# Patient Record
Sex: FEMALE | Race: WHITE | NOT HISPANIC OR LATINO | Employment: OTHER | URBAN - METROPOLITAN AREA
[De-identification: names, ages, dates, MRNs, and addresses within clinical notes are randomized per-mention and may not be internally consistent; named-entity substitution may affect disease eponyms.]

---

## 2018-11-26 ENCOUNTER — PATIENT OUTREACH (OUTPATIENT)
Dept: ADMINISTRATIVE | Facility: HOSPITAL | Age: 68
End: 2018-11-26

## 2018-12-03 ENCOUNTER — HOSPITAL ENCOUNTER (OUTPATIENT)
Dept: RADIOLOGY | Facility: HOSPITAL | Age: 68
Discharge: HOME | End: 2018-12-03
Attending: FAMILY MEDICINE
Payer: MEDICARE

## 2018-12-03 ENCOUNTER — OFFICE VISIT (OUTPATIENT)
Dept: FAMILY MEDICINE | Facility: CLINIC | Age: 68
End: 2018-12-03
Payer: MEDICARE

## 2018-12-03 VITALS
HEART RATE: 88 BPM | OXYGEN SATURATION: 98 % | BODY MASS INDEX: 29.81 KG/M2 | WEIGHT: 157.88 LBS | SYSTOLIC BLOOD PRESSURE: 140 MMHG | DIASTOLIC BLOOD PRESSURE: 94 MMHG | TEMPERATURE: 98 F | HEIGHT: 61 IN

## 2018-12-03 DIAGNOSIS — R03.0 ELEVATED BLOOD PRESSURE READING IN OFFICE WITHOUT DIAGNOSIS OF HYPERTENSION: ICD-10-CM

## 2018-12-03 DIAGNOSIS — R10.84 GENERALIZED ABDOMINAL PAIN: Primary | ICD-10-CM

## 2018-12-03 DIAGNOSIS — R10.84 GENERALIZED ABDOMINAL PAIN: ICD-10-CM

## 2018-12-03 DIAGNOSIS — Z11.59 ENCOUNTER FOR HEPATITIS C SCREENING TEST FOR LOW RISK PATIENT: ICD-10-CM

## 2018-12-03 DIAGNOSIS — K29.70 GASTRITIS, PRESENCE OF BLEEDING UNSPECIFIED, UNSPECIFIED CHRONICITY, UNSPECIFIED GASTRITIS TYPE: ICD-10-CM

## 2018-12-03 DIAGNOSIS — Z87.891 EX-SMOKER: ICD-10-CM

## 2018-12-03 DIAGNOSIS — Z82.49 FAMILY HISTORY OF HEART ATTACK: ICD-10-CM

## 2018-12-03 DIAGNOSIS — R11.2 INTRACTABLE VOMITING WITH NAUSEA, UNSPECIFIED VOMITING TYPE: ICD-10-CM

## 2018-12-03 DIAGNOSIS — Z12.11 COLON CANCER SCREENING: ICD-10-CM

## 2018-12-03 DIAGNOSIS — E78.49 OTHER HYPERLIPIDEMIA: ICD-10-CM

## 2018-12-03 PROCEDURE — 3288F FALL RISK ASSESSMENT DOCD: CPT | Mod: CPTII,S$GLB,, | Performed by: FAMILY MEDICINE

## 2018-12-03 PROCEDURE — 74019 RADEX ABDOMEN 2 VIEWS: CPT | Mod: TC,FY,PO

## 2018-12-03 PROCEDURE — 99999 PR PBB SHADOW E&M-EST. PATIENT-LVL III: CPT | Mod: PBBFAC,,, | Performed by: FAMILY MEDICINE

## 2018-12-03 PROCEDURE — 1100F PTFALLS ASSESS-DOCD GE2>/YR: CPT | Mod: CPTII,S$GLB,, | Performed by: FAMILY MEDICINE

## 2018-12-03 PROCEDURE — 74019 RADEX ABDOMEN 2 VIEWS: CPT | Mod: 26,,, | Performed by: RADIOLOGY

## 2018-12-03 PROCEDURE — 99204 OFFICE O/P NEW MOD 45 MIN: CPT | Mod: S$GLB,,, | Performed by: FAMILY MEDICINE

## 2018-12-03 RX ORDER — IBUPROFEN 200 MG
200 TABLET ORAL EVERY 6 HOURS PRN
COMMUNITY
End: 2018-12-11 | Stop reason: SINTOL

## 2018-12-03 RX ORDER — HYDROGEN PEROXIDE 3 %
20 SOLUTION, NON-ORAL MISCELLANEOUS
COMMUNITY

## 2018-12-03 NOTE — PATIENT INSTRUCTIONS
Prevention Guidelines, Women Ages 65 and Older  Screening tests and vaccines are an important part of managing your health. Health counseling is essential, too. Below are guidelines for these, for women ages 65 and older. Talk with your healthcare provider to make sure youre up to date on what you need.  Screening Who needs it How often   Type 2 diabetes or prediabetes All adults beginning at age 45 and adults without symptoms at any age who are overweight or obese and have 1 or more additional risk factors for diabetes At least every 3 years   Alcohol misuse All women in this age group At routine exams   Blood pressure All women in this age group Every 2 years if your blood pressure is less than 120/80 mm Hg; yearly if your systolic blood pressure is 120 to 139 mm Hg, or your diastolic blood pressure reading is 80 to 89 mm Hg   Breast cancer All women in this age group Yearly mammogram and clinical breast exam1   Cervical cancer Only women who had abnormal screening results before age 65 Talk with your healthcare provider   Chlamydia Women at increased risk for infection At routine exams   Colorectal cancer All women in this age group1 Flexible sigmoidoscopy every 5 years, or colonoscopy every 10 years, or double-contrast barium enema every 5 years; yearly fecal occult blood test or fecal immunochemical test; or a stool DNA test as often as your healthcare provider advises; talk with your healthcare provider about which tests are best for you   Depression All women in this age group At routine exams   Gonorrhea Sexually active women at increased risk for infection At routine exams   Hepatitis C Anyone at increased risk; 1 time for those born between 1945 and 1965 At routine exams   High cholesterol or triglycerides All women in this age group who are at risk for coronary artery disease At least every 5 years   HIV Women at increased risk for infection - talk with your healthcare provider At routine exams   Lung  cancer Adults age 55 to 80 who have smoked Yearly screening in smokers with 30 pack-year history of smoking or who quit within 15 years   Obesity All women in this age group At routine exams   Osteoporosis All women in this age group Bone density test at age 65, then follow-up as advised by your healthcare provider   Syphilis Women at increased risk for infection - talk with your healthcare provider At routine exams   Thyroid-Stimulating Hormone (TSH) All women in this age group Every 5 years   Tuberculosis Women at increased risk for infection - talk with your healthcare provider Ask your healthcare provider   Vision All women in this age group Every 1 to 2 years; if you have a chronic health condition, ask your healthcare provider if you need exams more often   Vaccine Who needs it How often   Chickenpox (varicella) All women in this age group who have no record of this infection or vaccine 2 doses; second dose should be given at least 4 weeks after the first dose   Hepatitis A Women at increased risk for infection - talk with your healthcare provider 2 doses given 6 months apart   Hepatitis B Women at increased risk for infection - talk with your healthcare provider 3 doses over 6 months; second dose should be given 1 month after the first dose; the third dose should be given at least 2 months after the second dose and at least 4 months after the first dose   Haemophilus influenza Type B (HIB) Women at increased risk for infection - talk with your healthcare provider 1 to 3 doses   Influenza (flu) All women in this age group Once a year   Pneumococcal conjugate vaccine (PCV13) and pneumococcal polysaccharide vaccine (PPSV23) All women in this age group 1 dose of each vaccine   Tetanus/diphtheria/pertussis (Td/Tdap) booster All women in this age group Td every 10 years, or a one-time dose of Tdap instead of a Td booster after age 18, then Td every 10 years   Zoster All women in this age group 1 dose   Counseling  Who needs it How often   Diet and exercise Women who are overweight or obese When diagnosed, and then at routine exams   Fall prevention (exercise and vitamin D supplements) All women in this age group At routine exams   Sexually transmitted infection prevention Women at increased risk for infection - talk with your healthcare provider At routine exams   Use of daily aspirin Women ages 55 and up in this age group who are at risk for cardiovascular health problems such as stroke When your risk is known   Use of tobacco and the health effects it can cause All women in this age group Every exam   1[employer]  Date Last Reviewed: 8/9/2015  © 2710-3308 [employer]. [address] [place] [address], [place] [address]. All rights reserved. This information is not intended as a substitute for professional medical care. Always follow your healthcare professional's instructions.

## 2018-12-03 NOTE — PROGRESS NOTES
Subjective:       Patient ID: Leanna Monroy is a 68 y.o. female.    Chief Complaint: Establish Care and GI Problem    GI problems: present for more than 6 months.  Gradual onset and worsening in the past month. Describes it as a swelling in the epigastric region.  Better when she gets up in the morning, but as the day goes by, it starts swelling. In the past month, she has been having nausea vomiting. Vomitus-mainly fluid and sometimes food.  Also associated with feeling full too quickly and inability to eat as much.  Advocates GERD for which she takes Nexium.  Denies diarrhea, fever, no weight loss and no hematemesis.  She denies abdominal pain but says she can feel pressure in the epigastric area    She has been on Nexium since  when she was diagnosed with gastritis and that was the last time she had seen a provider.    Blood pressure was found to be elevated and she has no hypertension.    Also with hyperlipidemia per pt.    30 pack smoking year and quit 5 months ago on her own.    Her father  at 53 and the brother  at 42  with MI.    Past Medical History:   Diagnosis Date    History of stomach ulcers      Family History   Problem Relation Age of Onset    Cancer Mother     Dementia Mother     Heart attack Father     Emphysema Father      Social History     Socioeconomic History    Marital status:      Spouse name: Not on file    Number of children: Not on file    Years of education: Not on file    Highest education level: Not on file   Social Needs    Financial resource strain: Not on file    Food insecurity - worry: Not on file    Food insecurity - inability: Not on file    Transportation needs - medical: Not on file    Transportation needs - non-medical: Not on file   Occupational History    Not on file   Tobacco Use    Smoking status: Former Smoker     Last attempt to quit: 2018     Years since quittin.9   Substance and Sexual Activity    Alcohol use: No      "Frequency: Never    Drug use: No    Sexual activity: Yes     Partners: Male   Other Topics Concern    Not on file   Social History Narrative    Not on file     Outpatient Encounter Medications as of 12/3/2018   Medication Sig Dispense Refill    esomeprazole (NEXIUM) 20 MG capsule Take 20 mg by mouth before breakfast.      ibuprofen (ADVIL,MOTRIN) 200 MG tablet Take 200 mg by mouth every 6 (six) hours as needed for Pain.       No facility-administered encounter medications on file as of 12/3/2018.        Review of Systems   Constitutional: Negative for chills and fever.   HENT: Negative for congestion and facial swelling.    Eyes: Negative for discharge and itching.   Respiratory: Negative for cough and wheezing.    Cardiovascular: Negative for chest pain and palpitations.   Gastrointestinal: Negative for abdominal pain, nausea and vomiting.   Endocrine: Negative for cold intolerance and heat intolerance.   Genitourinary: Negative for dysuria and flank pain.   Musculoskeletal: Negative for myalgias and neck stiffness.   Skin: Negative for pallor and wound.   Neurological: Negative for facial asymmetry and weakness.   Psychiatric/Behavioral: Negative for agitation and suicidal ideas.       Objective:      BP (!) 140/94 (BP Location: Right arm, Patient Position: Sitting, BP Method: Medium (Manual))   Pulse 88   Temp 98.2 °F (36.8 °C) (Oral)   Ht 5' 1" (1.549 m)   Wt 71.6 kg (157 lb 13.6 oz)   SpO2 98%   BMI 29.83 kg/m²   Physical Exam   Constitutional: She is oriented to person, place, and time. She appears well-developed. No distress.   HENT:   Head: Normocephalic and atraumatic.   Right Ear: External ear normal.   Left Ear: External ear normal.   Eyes: Conjunctivae and EOM are normal.   Neck: Neck supple. No thyromegaly present.   Cardiovascular: Normal rate and regular rhythm.   Pulmonary/Chest: Effort normal. No respiratory distress.   Abdominal: Soft. Bowel sounds are normal. She exhibits no " distension. There is no hepatosplenomegaly. There is tenderness in the right upper quadrant, right lower quadrant and epigastric area. There is no rebound, no guarding, no CVA tenderness and negative Frank's sign.       Genitourinary:   Genitourinary Comments: deferred   Musculoskeletal: She exhibits no edema or deformity.   Lymphadenopathy:        Head (right side): No submandibular adenopathy present.        Head (left side): No submandibular adenopathy present.     She has no cervical adenopathy.   Neurological: She is alert and oriented to person, place, and time.   Skin: Skin is warm and dry.   Psychiatric: She has a normal mood and affect. Her behavior is normal.   Nursing note and vitals reviewed.      Results for orders placed or performed during the hospital encounter of 01/26/11   CBC auto differential   Result Value Ref Range    WBC 5.70 4.8 - 10.8 K/uL    RBC 4.39 4.00 - 5.40 M/uL    Hemoglobin 13.9 12.0 - 16.0 gm/dl    Hematocrit 39.5 37.0 - 48.5 %    MCV 90.1 82 - 95 fL    MCH 31.8 (H) 27 - 31 pg    MCHC 35.3 32 - 36 %    RDW 14.4 11.5 - 14.5 %    Gran% 64.4 38 - 73 %    Lymph% 29.4 21 - 44 %    Mono% 4.3 0.0 - 7.4 %    Eosinophil% 1.5 0.0 - 4.2 %    Basophil% 0.4 0 - 1.9 %    Gran # (ANC) 3.7 1.8 - 7.7 K/uL    Lymph # 1.7 1 - 4.8 K/uL    Mono # 0.2 0.0 - 0.8 K/uL    Eos # 0.1 0 - 0.45 K/uL    Baso # 0.0 0.0 - 0.2 K/uL    Platelets 240 150 - 350 K/uL    MPV 8.5 (L) 9.2 - 12.9 fL   Hepatic function panel   Result Value Ref Range    Albumin 4.1 3.5 - 5.2 g/dl    Total Bilirubin 0.2 0.1 - 1.0 mg/dl    Bilirubin, Direct 0.0 (L) 0.1 - 0.3 mg/dl    AST 17 10 - 40 U/L    ALT 26 10 - 44 U/L    Alkaline Phosphatase 103 55 - 135 U/L    Total Protein 7.3 6.0 - 8.4 g/dL   Basic metabolic panel   Result Value Ref Range    BUN, Bld 15 6 - 20 mg/dl    Sodium 139 136 - 145 mmol/L    Potassium 4.2 3.5 - 5.1 mmol/L    Chloride 104 95 - 110 mmol/L    CO2 32.5 (H) 23.0 - 29.0 mmol/L    Glucose 107 70 - 110 mg/dl     Creatinine 0.9 0.5 - 1.4 mg/dl    Calcium 9.1 8.7 - 10.5 mg/dl    Anion Gap 8 (L) 10 - 20 mmol/L    eGFR if non African American >60 >60 mL/min    eGFR if African American >60 >60 mL/min     Assessment:       1. Generalized abdominal pain    2. Colon cancer screening    3. Intractable vomiting with nausea, unspecified vomiting type    4. Gastritis, presence of bleeding unspecified, unspecified chronicity, unspecified gastritis type    5. Ex-smoker    6. Elevated blood pressure reading in office without diagnosis of hypertension    7. Family history of heart attack    8. Encounter for hepatitis C screening test for low risk patient    9. Laboratory exam ordered as part of routine general medical examination    10. Other hyperlipidemia        Plan:   Colon cancer screening  -     Fecal Immunochemical Test (iFOBT); Future; Expected date: 12/03/2018        Intractable vomiting with nausea, unspecified vomiting type  -     Lipase; Future; Expected date: 12/03/2018  -     H. PYLORI ANTIBODY, IGG; Future; Expected date: 12/03/2018      Gastritis, presence of bleeding unspecified, unspecified chronicity, unspecified gastritis type  -     H. PYLORI ANTIBODY, IGG; Future; Expected date: 12/03/2018    Ex-smoker  Comments:  quit 5 months ago    Elevated blood pressure reading in office without diagnosis of hypertension  -     CBC auto differential; Future; Expected date: 12/03/2018  -     Comprehensive metabolic panel; Future; Expected date: 12/03/2018    Family history of heart attack  Comments:  brother at 42    Screening for hyperlipidemia    Encounter for hepatitis C screening test for low risk patient  -     Hepatitis C antibody; Future; Expected date: 12/03/2018    Other hyperlipidemia  -     Lipid panel; Future; Expected date: 12/03/2018  -     TSH; Future; Expected date: 12/03/2018    Generalized abdominal pain; on exam  -     X-Ray Abdomen Flat And Erect; Future; Expected date: 12/03/2018    A total of 45 mins was  spent in face to face time, of which over 50 % was spent in counseling patient on disease process, complications, treatment, and side effects of medications.

## 2018-12-05 ENCOUNTER — APPOINTMENT (OUTPATIENT)
Dept: LAB | Facility: HOSPITAL | Age: 68
End: 2018-12-05
Attending: FAMILY MEDICINE
Payer: MEDICARE

## 2018-12-06 ENCOUNTER — TELEPHONE (OUTPATIENT)
Dept: FAMILY MEDICINE | Facility: CLINIC | Age: 68
End: 2018-12-06

## 2018-12-06 NOTE — TELEPHONE ENCOUNTER
----- Message from Rebecca Anderson sent at 12/6/2018  9:57 AM CST -----  Patient mailed in FOBT stool specimen with NO collection date because specimen is time sensitive a collection date is needed ASAP, please call lab at Ext [phone] with collection date

## 2018-12-06 NOTE — TELEPHONE ENCOUNTER
----- Message from Gallo Bledsoe sent at 12/6/2018 10:13 AM CST -----  Contact: [phone]   Returning call, please call back at [phone]. Thx-AH

## 2018-12-11 ENCOUNTER — HOSPITAL ENCOUNTER (OUTPATIENT)
Dept: RADIOLOGY | Facility: HOSPITAL | Age: 68
Discharge: HOME | End: 2018-12-11
Attending: FAMILY MEDICINE
Payer: MEDICARE

## 2018-12-11 ENCOUNTER — OFFICE VISIT (OUTPATIENT)
Dept: FAMILY MEDICINE | Facility: CLINIC | Age: 68
End: 2018-12-11
Payer: MEDICARE

## 2018-12-11 VITALS
HEIGHT: 60 IN | DIASTOLIC BLOOD PRESSURE: 86 MMHG | OXYGEN SATURATION: 99 % | BODY MASS INDEX: 31.09 KG/M2 | SYSTOLIC BLOOD PRESSURE: 144 MMHG | HEART RATE: 76 BPM | TEMPERATURE: 99 F | WEIGHT: 158.38 LBS

## 2018-12-11 DIAGNOSIS — E78.5 HYPERLIPIDEMIA, UNSPECIFIED HYPERLIPIDEMIA TYPE: ICD-10-CM

## 2018-12-11 DIAGNOSIS — R03.0 ELEVATED BLOOD-PRESSURE READING WITHOUT DIAGNOSIS OF HYPERTENSION: ICD-10-CM

## 2018-12-11 DIAGNOSIS — R11.2 INTRACTABLE VOMITING WITH NAUSEA, UNSPECIFIED VOMITING TYPE: Primary | ICD-10-CM

## 2018-12-11 DIAGNOSIS — R11.2 INTRACTABLE VOMITING WITH NAUSEA, UNSPECIFIED VOMITING TYPE: ICD-10-CM

## 2018-12-11 PROCEDURE — 25500020 PHARM REV CODE 255: Performed by: FAMILY MEDICINE

## 2018-12-11 PROCEDURE — 3288F FALL RISK ASSESSMENT DOCD: CPT | Mod: CPTII,S$GLB,, | Performed by: FAMILY MEDICINE

## 2018-12-11 PROCEDURE — 99999 PR PBB SHADOW E&M-EST. PATIENT-LVL IV: CPT | Mod: PBBFAC,,, | Performed by: FAMILY MEDICINE

## 2018-12-11 PROCEDURE — 99214 OFFICE O/P EST MOD 30 MIN: CPT | Mod: S$GLB,,, | Performed by: FAMILY MEDICINE

## 2018-12-11 PROCEDURE — 74177 CT ABD & PELVIS W/CONTRAST: CPT | Mod: TC

## 2018-12-11 PROCEDURE — 1100F PTFALLS ASSESS-DOCD GE2>/YR: CPT | Mod: CPTII,S$GLB,, | Performed by: FAMILY MEDICINE

## 2018-12-11 RX ORDER — ONDANSETRON 4 MG/1
4 TABLET, FILM COATED ORAL EVERY 8 HOURS PRN
Qty: 30 TABLET | Refills: 0 | Status: SHIPPED | OUTPATIENT
Start: 2018-12-11

## 2018-12-11 RX ORDER — ATORVASTATIN CALCIUM 10 MG/1
10 TABLET, FILM COATED ORAL DAILY
Qty: 30 TABLET | Refills: 11 | Status: SHIPPED | OUTPATIENT
Start: 2018-12-11 | End: 2023-07-31

## 2018-12-11 RX ADMIN — IOHEXOL 30 ML: 350 INJECTION, SOLUTION INTRAVENOUS at 05:12

## 2018-12-11 RX ADMIN — IOHEXOL 75 ML: 350 INJECTION, SOLUTION INTRAVENOUS at 05:12

## 2018-12-11 NOTE — PATIENT INSTRUCTIONS
Oncology: Controlling Nausea and Vomiting     Taken before meals, medicines can help ease nausea.    Nausea and vomiting are common side effects of chemotherapy and radiation therapy. Side effects happen when treatment changes some normal cells as well as cancer cells. In this case, the cells lining your stomach and the part of your brain that controls vomiting are affected.  Nausea is feeling that you need to throw up. Vomiting is when you actually do throw up. This is when your body forces food that is in your stomach out through your mouth.  Nausea and vomiting are common. They can be caused by many things. These include:  · Stomach flu (gastroenteritis)  · Food poisoning  · Stomach pain (gastritis)  · Blockages in the digestive system  · Constipation  · Infection  · Anxiety and stress  They can also be caused by a head injury, an infection in the brain or inside the ear, or migraines. Other common causes of nausea and vomiting include:  · Brain tumor  · Brain bruise or injury  · Motion sickness  · Alcohol, pain medicines such as morphine, and cancer medicines (chemotherapy)  · Certain medical treatments, such as radiation therapy  · Poisonous things (toxins) such as plants or liquids that are swallowed by accident  · Advanced types of cancer  · Movement problems (psychogenic problems)  Extra pressure in the fluid that surrounds the brain and spinal cord (elevated intracranial pressure)  Sometimes belly (abdominal) pain and cramps are experienced along with nausea and vomiting. The symptoms can be mild and go away by themselves. Other symptoms can be serious and must be treated.  When to seek medical advice  Nausea and vomiting can happen before, during, or after cancer treatments. But it can be controlled. Dont consider it a normal part of cancer and cancer treatment. If not managed, it can become serious. It can change the fluid and chemical balances in your body, and could even keep you from getting cancer  treatment. Call your healthcare provider right away if any of the following occur:  · You have nausea or vomiting that lasts 24 hours or more  · You cant take your antiemetics, or they are not working  · You have trouble keeping fluids down  · You become dizzy, lightheaded, or confused  · You have very dark urine or you stop urinating  Talk to your healthcare provider about your treatment and the nausea and vomiting management plan that's best for you. Be sure you know how and when to use antiemetics and when to call your provider.   Medicines can help  Nausea or vomiting can often be prevented or controlled with medicines called antiemetics. Your provider may give you antiemetics before or after treatment if you are getting chemotherapy or other treatments that cause nausea or vomiting. You may have to try different medicines or different combinations of medicines to get relief. But in nearly all cases, nausea and vomiting can be relieved.  Eating tips  · If you have medicines to control nausea, take them before meals as directed.  · Avoid fatty or greasy foods while nauseated.  · Eat small meals slowly throughout the day.  · Ask someone to sit with you while you eat to keep you from thinking about feeling nauseated.  · Eat foods at room temperature or colder to avoid strong smells.  · Eat dry foods, such as toast, crackers, or pretzels. Also eat cool, light foods, such as applesauce, and bland foods, such as oatmeal or skinned chicken.  · Try to keep taking in clear fluids in small sips, or as ice chips, gelatin, or ice pops.  Other ways to feel better  · Get a little fresh air. Take a short walk.  · Talk to a friend, listen to music, or watch TV.  · Take a few deep, slow breaths.  · Eat by candlelight or in surroundings that you find relaxing.  · Use a method to help you relax, such as guided imagery. Imagine yourself in a beautiful, restful scene. Or daydream about the place youd most like to be.  Date Last  Reviewed: 12/1/2016  © 2144-1894 [employer]. [address] [place] [address], [place] [address]. All rights reserved. This information is not intended as a substitute for professional medical care. Always follow your healthcare professional's instructions.

## 2018-12-12 ENCOUNTER — TELEPHONE (OUTPATIENT)
Dept: FAMILY MEDICINE | Facility: CLINIC | Age: 68
End: 2018-12-12

## 2018-12-12 NOTE — TELEPHONE ENCOUNTER
----- Message from Yoselin Trevizo sent at 12/12/2018 10:14 AM CST -----  Contact: pt  The pt states she is returning a missed call, the pt can be reached at [phone]///[phone]

## 2018-12-13 DIAGNOSIS — Z12.39 BREAST CANCER SCREENING: ICD-10-CM

## 2019-01-11 ENCOUNTER — LAB VISIT (OUTPATIENT)
Dept: LAB | Facility: HOSPITAL | Age: 69
End: 2019-01-11
Attending: FAMILY MEDICINE
Payer: MEDICARE

## 2019-01-11 DIAGNOSIS — E78.5 HYPERLIPIDEMIA, UNSPECIFIED HYPERLIPIDEMIA TYPE: ICD-10-CM

## 2019-01-11 LAB
ALBUMIN SERPL BCP-MCNC: 3.6 G/DL
ALP SERPL-CCNC: 128 U/L
ALT SERPL W/O P-5'-P-CCNC: 35 U/L
ANION GAP SERPL CALC-SCNC: 9 MMOL/L
AST SERPL-CCNC: 37 U/L
BILIRUB SERPL-MCNC: 0.4 MG/DL
BUN SERPL-MCNC: 14 MG/DL
CALCIUM SERPL-MCNC: 9.5 MG/DL
CHLORIDE SERPL-SCNC: 104 MMOL/L
CO2 SERPL-SCNC: 30 MMOL/L
CREAT SERPL-MCNC: 0.8 MG/DL
EST. GFR  (AFRICAN AMERICAN): >60 ML/MIN/1.73 M^2
EST. GFR  (NON AFRICAN AMERICAN): >60 ML/MIN/1.73 M^2
GLUCOSE SERPL-MCNC: 85 MG/DL
POTASSIUM SERPL-SCNC: 4.4 MMOL/L
PROT SERPL-MCNC: 7.1 G/DL
SODIUM SERPL-SCNC: 143 MMOL/L

## 2019-01-11 PROCEDURE — 80053 COMPREHEN METABOLIC PANEL: CPT

## 2019-01-11 PROCEDURE — 36415 COLL VENOUS BLD VENIPUNCTURE: CPT | Mod: PO

## 2019-03-11 ENCOUNTER — PATIENT OUTREACH (OUTPATIENT)
Dept: ADMINISTRATIVE | Facility: HOSPITAL | Age: 69
End: 2019-03-11

## 2019-03-13 ENCOUNTER — PATIENT MESSAGE (OUTPATIENT)
Dept: FAMILY MEDICINE | Facility: CLINIC | Age: 69
End: 2019-03-13

## 2019-03-13 ENCOUNTER — TELEPHONE (OUTPATIENT)
Dept: FAMILY MEDICINE | Facility: CLINIC | Age: 69
End: 2019-03-13

## 2019-03-13 DIAGNOSIS — Z12.39 BREAST CANCER SCREENING: Primary | ICD-10-CM

## 2019-03-13 NOTE — TELEPHONE ENCOUNTER
mychart message sent to patient notifying her of below.      MD Sami Kenyon Staff 1 minute ago (4:48 PM)      See order for mammo and notify patient and ask to make an appt to catch up on health maintenance

## 2019-05-30 ENCOUNTER — DOCUMENTATION ONLY (OUTPATIENT)
Dept: FAMILY MEDICINE | Facility: CLINIC | Age: 69
End: 2019-05-30

## 2019-05-30 NOTE — PROGRESS NOTES
5/30/19 letter mailed to remind pt to schedule her mammogram  [facility]

## 2019-07-01 ENCOUNTER — PATIENT OUTREACH (OUTPATIENT)
Dept: ADMINISTRATIVE | Facility: HOSPITAL | Age: 69
End: 2019-07-01

## 2019-07-01 NOTE — PROGRESS NOTES
HTN REPORT:Attempting to contact pt to schedule over due HM & F/U. Unable to reach patient at this time. Left voicemail.

## 2019-12-27 ENCOUNTER — PATIENT OUTREACH (OUTPATIENT)
Dept: ADMINISTRATIVE | Facility: HOSPITAL | Age: 69
End: 2019-12-27

## 2020-04-02 NOTE — PROGRESS NOTES
Subjective:       Patient ID: Leanna Monroy is a 68 y.o. female.    Chief Complaint: Follow-up GI problems  GI problems. Cronic. Present for 7 months. Worsening. Associated with epigastric bloating, post prandial diffuse abd ache which is worse with standing, and  Intractable nausea and vomiting-unable to keep anything down. She was seen for the first time one month ago. So far labs were negative for H pylori and lipase. She is on nexium for GERD.       Hyperlipidemia: Latest lab discussed with patient. Calculated ASCVD 10 yr risk for MI and Stroke is 8.6%.    Elevated BP on exam. Reports that her BP is monitored at home and it is usually below 130/80.    Past Medical History:   Diagnosis Date    History of stomach ulcers      Family History   Problem Relation Age of Onset    Cancer Mother     Dementia Mother     Heart attack Father     Emphysema Father      Social History     Socioeconomic History    Marital status:      Spouse name: Not on file    Number of children: Not on file    Years of education: Not on file    Highest education level: Not on file   Social Needs    Financial resource strain: Not on file    Food insecurity - worry: Not on file    Food insecurity - inability: Not on file    Transportation needs - medical: Not on file    Transportation needs - non-medical: Not on file   Occupational History    Not on file   Tobacco Use    Smoking status: Former Smoker     Last attempt to quit: 2018     Years since quittin.9   Substance and Sexual Activity    Alcohol use: No     Frequency: Never    Drug use: No    Sexual activity: Yes     Partners: Male   Other Topics Concern    Not on file   Social History Narrative    Not on file     Outpatient Encounter Medications as of 2018   Medication Sig Dispense Refill    esomeprazole (NEXIUM) 20 MG capsule Take 20 mg by mouth before breakfast.      [DISCONTINUED] ibuprofen (ADVIL,MOTRIN) 200 MG tablet Take 200 mg by mouth every  6 (six) hours as needed for Pain.      atorvastatin (LIPITOR) 10 MG tablet Take 1 tablet (10 mg total) by mouth once daily. 30 tablet 11    ondansetron (ZOFRAN) 4 MG tablet Take 1 tablet (4 mg total) by mouth every 8 (eight) hours as needed for Nausea. 30 tablet 0     No facility-administered encounter medications on file as of 12/11/2018.        Review of Systems   Constitutional: Negative for chills and fever.   HENT: Negative for congestion and facial swelling.    Eyes: Negative for discharge and itching.   Respiratory: Negative for cough and wheezing.    Cardiovascular: Negative for chest pain and palpitations.   Gastrointestinal: Positive for abdominal distention, abdominal pain, constipation, nausea and vomiting. Negative for anal bleeding, blood in stool and diarrhea.   Endocrine: Negative for cold intolerance and heat intolerance.   Genitourinary: Negative for dysuria and flank pain.   Musculoskeletal: Negative for myalgias and neck stiffness.   Skin: Negative for pallor and wound.   Neurological: Negative for facial asymmetry and weakness.   Psychiatric/Behavioral: Negative for agitation and suicidal ideas.       Objective:      BP (!) 144/86 (BP Location: Left arm, Patient Position: Sitting, BP Method: Medium (Manual))   Pulse 76   Temp 98.6 °F (37 °C) (Oral)   Ht 5' (1.524 m)   Wt 71.9 kg (158 lb 6.4 oz)   SpO2 99%   BMI 30.94 kg/m²   Physical Exam   Constitutional: She is oriented to person, place, and time. She appears well-developed. No distress.   HENT:   Head: Normocephalic and atraumatic.   Right Ear: External ear normal.   Left Ear: External ear normal.   Eyes: Conjunctivae and EOM are normal.   Neck: Neck supple. No thyromegaly present.   Cardiovascular: Normal rate and regular rhythm.   Pulmonary/Chest: Effort normal and breath sounds normal. No respiratory distress.   Abdominal: Soft. Bowel sounds are normal. She exhibits no distension. There is no tenderness.   Genitourinary:    Genitourinary Comments: deferred   Musculoskeletal: She exhibits no edema or deformity.   Lymphadenopathy:        Head (right side): No submandibular adenopathy present.        Head (left side): No submandibular adenopathy present.     She has no cervical adenopathy.   Neurological: She is alert and oriented to person, place, and time.   Skin: Skin is warm and dry.   Psychiatric: She has a normal mood and affect. Her behavior is normal.   Nursing note and vitals reviewed.      Results for orders placed or performed in visit on 12/05/18   Fecal Immunochemical Test (iFOBT)   Result Value Ref Range    Fecal Immunochemical Test (iFOBT) Negative Negative     Assessment:       1. Intractable vomiting with nausea, unspecified vomiting type    2. Hyperlipidemia, unspecified hyperlipidemia type    3. Elevated blood-pressure reading without diagnosis of hypertension        Plan:   Intractable vomiting with nausea, unspecified vomiting type  -     CT Abdomen Pelvis With Contrast; Future; Expected date: 12/11/2018    Hyperlipidemia, unspecified hyperlipidemia type  -     Lipid panel; Future; Expected date: 03/11/2019  -     Comprehensive metabolic panel; Future; Expected date: 01/11/2019  -     atorvastatin (LIPITOR) 10 MG tablet; Take 1 tablet (10 mg total) by mouth once daily.  Dispense: 30 tablet; Refill: 11  Statin drugs can cause muscle pain, fatigue, liver enzyme elevation and mildly increase your risk of diabetes.  You must have your liver enzymes checked 6 wks after starting statin therapy and thereafter every 6 months or sooner if needed.   May use coq10 to decrease muscle pain and fatigue.   Call if any symptoms develop.    Elevated blood-pressure reading without diagnosis of hypertension  Continue to monitor at home and bring record  Also bring your machine on your next visit  Other orders  -     ondansetron (ZOFRAN) 4 MG tablet; Take 1 tablet (4 mg total) by mouth every 8 (eight) hours as needed for Nausea.   Dispense: 30 tablet; Refill: 0       - - -

## 2021-05-06 ENCOUNTER — PATIENT MESSAGE (OUTPATIENT)
Dept: RESEARCH | Facility: HOSPITAL | Age: 71
End: 2021-05-06

## 2023-07-28 ENCOUNTER — TELEPHONE (OUTPATIENT)
Dept: OBSTETRICS AND GYNECOLOGY | Facility: CLINIC | Age: 73
End: 2023-07-28
Payer: MEDICARE

## 2023-07-28 NOTE — TELEPHONE ENCOUNTER
----- Message from Ada Roldan MA sent at 7/28/2023 10:45 AM CDT -----  Contact: [phone]  Patient called                In regards to speak with staff to get scheduled a NP visit with provider with a concern of vaginal swelling. Patient stated that she was recommended by her PCP but does not have a referral in system.    .          Call back [phone]

## 2023-07-31 ENCOUNTER — OFFICE VISIT (OUTPATIENT)
Dept: OBSTETRICS AND GYNECOLOGY | Facility: CLINIC | Age: 73
End: 2023-07-31
Payer: MEDICARE

## 2023-07-31 VITALS
WEIGHT: 143.06 LBS | SYSTOLIC BLOOD PRESSURE: 124 MMHG | DIASTOLIC BLOOD PRESSURE: 80 MMHG | BODY MASS INDEX: 28.09 KG/M2 | HEIGHT: 60 IN

## 2023-07-31 DIAGNOSIS — Z00.00 NORMAL EXAM: Primary | ICD-10-CM

## 2023-07-31 PROCEDURE — 99202 OFFICE O/P NEW SF 15 MIN: CPT | Mod: S$GLB,,, | Performed by: NURSE PRACTITIONER

## 2023-07-31 PROCEDURE — 1160F PR REVIEW ALL MEDS BY PRESCRIBER/CLIN PHARMACIST DOCUMENTED: ICD-10-PCS | Mod: CPTII,S$GLB,, | Performed by: NURSE PRACTITIONER

## 2023-07-31 PROCEDURE — 99999 PR PBB SHADOW E&M-EST. PATIENT-LVL III: CPT | Mod: PBBFAC,,, | Performed by: NURSE PRACTITIONER

## 2023-07-31 PROCEDURE — 3008F BODY MASS INDEX DOCD: CPT | Mod: CPTII,S$GLB,, | Performed by: NURSE PRACTITIONER

## 2023-07-31 PROCEDURE — 99999 PR PBB SHADOW E&M-EST. PATIENT-LVL III: ICD-10-PCS | Mod: PBBFAC,,, | Performed by: NURSE PRACTITIONER

## 2023-07-31 PROCEDURE — 1159F MED LIST DOCD IN RCRD: CPT | Mod: CPTII,S$GLB,, | Performed by: NURSE PRACTITIONER

## 2023-07-31 PROCEDURE — 1159F PR MEDICATION LIST DOCUMENTED IN MEDICAL RECORD: ICD-10-PCS | Mod: CPTII,S$GLB,, | Performed by: NURSE PRACTITIONER

## 2023-07-31 PROCEDURE — 1126F AMNT PAIN NOTED NONE PRSNT: CPT | Mod: CPTII,S$GLB,, | Performed by: NURSE PRACTITIONER

## 2023-07-31 PROCEDURE — 1160F RVW MEDS BY RX/DR IN RCRD: CPT | Mod: CPTII,S$GLB,, | Performed by: NURSE PRACTITIONER

## 2023-07-31 PROCEDURE — 1126F PR PAIN SEVERITY QUANTIFIED, NO PAIN PRESENT: ICD-10-PCS | Mod: CPTII,S$GLB,, | Performed by: NURSE PRACTITIONER

## 2023-07-31 PROCEDURE — 3008F PR BODY MASS INDEX (BMI) DOCUMENTED: ICD-10-PCS | Mod: CPTII,S$GLB,, | Performed by: NURSE PRACTITIONER

## 2023-07-31 PROCEDURE — 99202 PR OFFICE/OUTPT VISIT, NEW, LEVL II, 15-29 MIN: ICD-10-PCS | Mod: S$GLB,,, | Performed by: NURSE PRACTITIONER

## 2023-07-31 PROCEDURE — 3079F PR MOST RECENT DIASTOLIC BLOOD PRESSURE 80-89 MM HG: ICD-10-PCS | Mod: CPTII,S$GLB,, | Performed by: NURSE PRACTITIONER

## 2023-07-31 PROCEDURE — 3079F DIAST BP 80-89 MM HG: CPT | Mod: CPTII,S$GLB,, | Performed by: NURSE PRACTITIONER

## 2023-07-31 PROCEDURE — 3074F SYST BP LT 130 MM HG: CPT | Mod: CPTII,S$GLB,, | Performed by: NURSE PRACTITIONER

## 2023-07-31 PROCEDURE — 3074F PR MOST RECENT SYSTOLIC BLOOD PRESSURE < 130 MM HG: ICD-10-PCS | Mod: CPTII,S$GLB,, | Performed by: NURSE PRACTITIONER

## 2023-07-31 RX ORDER — MAGNESIUM HYDROXIDE 400 MG/5ML
99 SUSPENSION, ORAL (FINAL DOSE FORM) ORAL
COMMUNITY

## 2023-07-31 RX ORDER — ACETAMINOPHEN, DIPHENHYDRAMINE HCL, PHENYLEPHRINE HCL 325; 25; 5 MG/1; MG/1; MG/1
10 TABLET ORAL
COMMUNITY

## 2023-07-31 NOTE — PROGRESS NOTES
Subjective:       Patient ID: Leanna Monroy is a 72 y.o. female.    Chief Complaint:  Groin Swelling (Pap Smear Wanted)    No LMP recorded. Patient is postmenopausal.  History of Present Illness  Over the past three months she has been experiencing vaginal swelling  There is pressure when it swells up but it does not cause any pain  Its usually worse in the evening when she has been on her feet, however vaginal swelling is not constant       OB History   No obstetric history on file.       Review of Systems  Review of Systems        Objective:    Physical Exam  Genitourinary:     General: Normal vulva.      Exam position: Lithotomy position.      Vagina: No signs of injury and foreign body. Prolapsed vaginal walls (anterior wall (denies symptoms)) present. No vaginal discharge, erythema, tenderness, bleeding or lesions.         Assessment:     1. Normal exam              Plan:   Leanna was seen today for groin swelling.    Diagnoses and all orders for this visit:    Normal exam      No vaginal/vulva swelling seen today

## 2023-08-22 ENCOUNTER — HOSPITAL ENCOUNTER (OUTPATIENT)
Dept: RADIOLOGY | Facility: HOSPITAL | Age: 73
Discharge: HOME | End: 2023-08-22
Attending: FAMILY MEDICINE
Payer: MEDICARE

## 2023-08-22 DIAGNOSIS — Z12.31 ENCOUNTER FOR SCREENING MAMMOGRAM FOR MALIGNANT NEOPLASM OF BREAST: ICD-10-CM

## 2023-08-22 PROCEDURE — 77067 MAMMO DIGITAL SCREENING BILAT WITH TOMO: ICD-10-PCS | Mod: 26,,, | Performed by: RADIOLOGY

## 2023-08-22 PROCEDURE — 77063 BREAST TOMOSYNTHESIS BI: CPT | Mod: 26,,, | Performed by: RADIOLOGY

## 2023-08-22 PROCEDURE — 77067 SCR MAMMO BI INCL CAD: CPT | Mod: 26,,, | Performed by: RADIOLOGY

## 2023-08-22 PROCEDURE — 77067 SCR MAMMO BI INCL CAD: CPT | Mod: TC

## 2023-08-22 PROCEDURE — 77063 MAMMO DIGITAL SCREENING BILAT WITH TOMO: ICD-10-PCS | Mod: 26,,, | Performed by: RADIOLOGY
